# Patient Record
Sex: MALE | Race: WHITE | Employment: UNEMPLOYED | URBAN - METROPOLITAN AREA
[De-identification: names, ages, dates, MRNs, and addresses within clinical notes are randomized per-mention and may not be internally consistent; named-entity substitution may affect disease eponyms.]

---
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
## 2022-04-08 NOTE — PROGRESS NOTES
07/08/19 0900   Psycho Education   Type of Intervention structured groups   Response unavailable   Treatment Detail Dual Group      Pt did not attend 0900 Day Start/Dual group. Pt was excused by RN from all AM groups, due to being up overnight until 0630 and experiencing hallucinations and paranoia. Pt was given Zyprexa on the overnight shift. Prioritizing sleep at this time.    Patent

## 2022-09-25 NOTE — ADDENDUM NOTE
Encounter addended by: Dominik Hein APRN CNP on: 7/18/2019 2:05 PM   Actions taken: Charge Capture section accepted Unknown

## 2023-01-24 NOTE — PROGRESS NOTES
"Pt was up and out of his room at 0515 with c/o \"can't breathe out of my nose\".  T 98.1 Bp 114/81 P 98 @ 0515.  Pt denies any other URI Sxs, says he may have environmental allergies but isn't sure and is requesting a nasal spray for nasal congestion.  Pt stated he is a little anxious.  Pt given Hydroxyzine 50 mg PO @ 0519 for anxiety and sleep.  " Med Requested:    Disp Refills Start End    amphetamine-dextroamphetamine (ADDERALL) 30 MG tablet 90 tablet 0 12/31/2022     Sig: Indications: Attention Deficit Hyperactivity Disorder Do not start before December 31, 2022. One tablet three time daily. Dx code F90.0      Last visit: 12/15/22  No Show/Cancel: none  Next visit: 3/22/2023     Controlled Med - Routed to Provider due to NO PROTOCOL. Orders have been prepped according to providers note.     Ordered date: 12/19/22  Last RX dispensed (per PDMP): 1/3/23

## 2023-03-12 NOTE — PROGRESS NOTES
7/11/2019        Dimension 4, 5 and 6  Group Chart Note - Co-facilitated with Lul COPELAND, Pennie Ac [facility] Healthcare[facility]ILVIA, DINORAH, and Andrea COPELAND.  Number of clients attending the group:  7     Rickey Gill attended 0.5 hour Community  group covering the following topics Interpersonal Effectiveness, Emotion Regulation and Relapse Prevention.  Client was Engaged.  Client's response:  Completed DBT Diary Card completion and shared events from the past day.      
7/11/2019 Dimension 3, 4 and 5  Group Chart Note - Co-facilitated with Pennie Ac [facility].  Number of clients attending the group:  9      Jairo Lang attended 1 hour DBT and Dual Process group covering the following topics Distress tolerance.  Client was Actively participating.  Client's response:  Client participated in group discussion about Distress Tolerance skill of radical acceptance.Talked about the process of radical acceptance versus suffering. Client completed a worksheet and processed what she can control and what she can't..  
7/11/2019 Dimension 3, 4, 5 and 6  Group Chart Note - Co-facilitated with Gavin COPELAND.  Number of clients attending the group:  9      Rickey Gill attended 1 hour Community  group covering the following topics Distress tolerance.  Client was Attentive.  Client's response:  Client was present for a group activity in which he and a group of peers developed a short DBT group for tomorrow.  Client and his peers decided to teach the self soothe skill and talked about how to go about teaching this.  
Behavioral Health  Note   Behavioral Health  Spirituality Group Note     Unit Stewart    Name: Rickey Gill    YOB: 2000   MRN: [id number]    Age: 19 year old     Patient attended -led group, which included discussion of spirituality, coping with illness and building resilience.   Today s topic was Values. Co-facilitated by DINORAH Ramon  Patient attended group for 1 hrs.   The patient actively participated in group discussion and patient demonstrated an appreciation of topic's application for their personal circumstances.     Nash Saucedo, [facility], DMin  Staff    Pager [phone]      
Yes

## 2023-05-04 NOTE — PROGRESS NOTES
1. What PRN did patient receive? Atarax/Vistaril    2. What was the patient doing that led to the PRN medication? Anxiety    3. Did they require R/S? NO    4. Side effects to PRN medication? None    5. After 1 Hour, patient appeared: Calm         Eye Shield Used: No

## 2025-07-31 ENCOUNTER — LAB REQUISITION (OUTPATIENT)
Dept: LAB | Facility: CLINIC | Age: 25
End: 2025-07-31
Payer: COMMERCIAL

## 2025-07-31 DIAGNOSIS — Z13.21 ENCOUNTER FOR SCREENING FOR NUTRITIONAL DISORDER: ICD-10-CM

## 2025-07-31 DIAGNOSIS — Z79.899 OTHER LONG TERM (CURRENT) DRUG THERAPY: ICD-10-CM

## 2025-07-31 DIAGNOSIS — Z13.29 ENCOUNTER FOR SCREENING FOR OTHER SUSPECTED ENDOCRINE DISORDER: ICD-10-CM

## 2025-07-31 LAB
ALBUMIN SERPL BCG-MCNC: 4.3 G/DL (ref 3.5–5.2)
ALP SERPL-CCNC: 77 U/L (ref 40–150)
ALT SERPL W P-5'-P-CCNC: 54 U/L (ref 0–70)
ANION GAP SERPL CALCULATED.3IONS-SCNC: 12 MMOL/L (ref 7–15)
AST SERPL W P-5'-P-CCNC: 32 U/L (ref 0–45)
BASOPHILS # BLD AUTO: 0.1 10E3/UL (ref 0–0.2)
BASOPHILS NFR BLD AUTO: 1 %
BILIRUB SERPL-MCNC: 0.2 MG/DL
BUN SERPL-MCNC: 14.6 MG/DL (ref 6–20)
CALCIUM SERPL-MCNC: 9.7 MG/DL (ref 8.8–10.4)
CHLORIDE SERPL-SCNC: 103 MMOL/L (ref 98–107)
CHOLEST SERPL-MCNC: 210 MG/DL
CREAT SERPL-MCNC: 1.1 MG/DL (ref 0.67–1.17)
EGFRCR SERPLBLD CKD-EPI 2021: >90 ML/MIN/1.73M2
EOSINOPHIL # BLD AUTO: 0.2 10E3/UL (ref 0–0.7)
EOSINOPHIL NFR BLD AUTO: 4 %
ERYTHROCYTE [DISTWIDTH] IN BLOOD BY AUTOMATED COUNT: 13.2 % (ref 10–15)
EST. AVERAGE GLUCOSE BLD GHB EST-MCNC: 114 MG/DL
FASTING STATUS PATIENT QL REPORTED: YES
GLUCOSE SERPL-MCNC: 96 MG/DL (ref 70–99)
HBA1C MFR BLD: 5.6 %
HCO3 SERPL-SCNC: 26 MMOL/L (ref 22–29)
HCT VFR BLD AUTO: 45.5 % (ref 40–53)
HDLC SERPL-MCNC: 37 MG/DL
HGB BLD-MCNC: 15.3 G/DL (ref 13.3–17.7)
IMM GRANULOCYTES # BLD: 0 10E3/UL
IMM GRANULOCYTES NFR BLD: 1 %
LDLC SERPL CALC-MCNC: 103 MG/DL
LYMPHOCYTES # BLD AUTO: 3.1 10E3/UL (ref 0.8–5.3)
LYMPHOCYTES NFR BLD AUTO: 50 %
MCH RBC QN AUTO: 29.3 PG (ref 26.5–33)
MCHC RBC AUTO-ENTMCNC: 33.6 G/DL (ref 31.5–36.5)
MCV RBC AUTO: 87 FL (ref 78–100)
MONOCYTES # BLD AUTO: 0.5 10E3/UL (ref 0–1.3)
MONOCYTES NFR BLD AUTO: 8 %
NEUTROPHILS # BLD AUTO: 2.3 10E3/UL (ref 1.6–8.3)
NEUTROPHILS NFR BLD AUTO: 38 %
NONHDLC SERPL-MCNC: 173 MG/DL
NRBC # BLD AUTO: 0 10E3/UL
NRBC BLD AUTO-RTO: 0 /100
PLATELET # BLD AUTO: 292 10E3/UL (ref 150–450)
POTASSIUM SERPL-SCNC: 4.2 MMOL/L (ref 3.4–5.3)
PROT SERPL-MCNC: 6.7 G/DL (ref 6.4–8.3)
RBC # BLD AUTO: 5.23 10E6/UL (ref 4.4–5.9)
SODIUM SERPL-SCNC: 141 MMOL/L (ref 135–145)
TRIGL SERPL-MCNC: 350 MG/DL
TSH SERPL DL<=0.005 MIU/L-ACNC: 2.01 UIU/ML (ref 0.3–4.2)
VIT D+METAB SERPL-MCNC: 34 NG/ML (ref 20–50)
WBC # BLD AUTO: 6.2 10E3/UL (ref 4–11)

## 2025-07-31 PROCEDURE — 80061 LIPID PANEL: CPT | Mod: ORL | Performed by: STUDENT IN AN ORGANIZED HEALTH CARE EDUCATION/TRAINING PROGRAM

## 2025-07-31 PROCEDURE — 84443 ASSAY THYROID STIM HORMONE: CPT | Mod: ORL | Performed by: STUDENT IN AN ORGANIZED HEALTH CARE EDUCATION/TRAINING PROGRAM

## 2025-07-31 PROCEDURE — 85025 COMPLETE CBC W/AUTO DIFF WBC: CPT | Mod: ORL | Performed by: STUDENT IN AN ORGANIZED HEALTH CARE EDUCATION/TRAINING PROGRAM

## 2025-07-31 PROCEDURE — 80053 COMPREHEN METABOLIC PANEL: CPT | Mod: ORL | Performed by: STUDENT IN AN ORGANIZED HEALTH CARE EDUCATION/TRAINING PROGRAM

## 2025-07-31 PROCEDURE — 82306 VITAMIN D 25 HYDROXY: CPT | Mod: ORL | Performed by: STUDENT IN AN ORGANIZED HEALTH CARE EDUCATION/TRAINING PROGRAM

## 2025-07-31 PROCEDURE — 83036 HEMOGLOBIN GLYCOSYLATED A1C: CPT | Mod: ORL | Performed by: STUDENT IN AN ORGANIZED HEALTH CARE EDUCATION/TRAINING PROGRAM
